# Patient Record
Sex: MALE | Race: WHITE | NOT HISPANIC OR LATINO | URBAN - METROPOLITAN AREA
[De-identification: names, ages, dates, MRNs, and addresses within clinical notes are randomized per-mention and may not be internally consistent; named-entity substitution may affect disease eponyms.]

---

## 2019-07-30 PROBLEM — Z00.00 ENCOUNTER FOR PREVENTIVE HEALTH EXAMINATION: Status: ACTIVE | Noted: 2019-07-30

## 2019-08-22 ENCOUNTER — OUTPATIENT (OUTPATIENT)
Dept: OUTPATIENT SERVICES | Facility: HOSPITAL | Age: 69
LOS: 1 days | End: 2019-08-22
Payer: MEDICARE

## 2019-08-22 ENCOUNTER — APPOINTMENT (OUTPATIENT)
Dept: INTERVENTIONAL RADIOLOGY/VASCULAR | Facility: HOSPITAL | Age: 69
End: 2019-08-22

## 2019-08-22 LAB — GLUCOSE BLDC GLUCOMTR-MCNC: 124 MG/DL — HIGH (ref 70–99)

## 2019-08-22 PROCEDURE — A9552: CPT

## 2019-08-22 PROCEDURE — 78815 PET IMAGE W/CT SKULL-THIGH: CPT

## 2019-08-22 PROCEDURE — 82962 GLUCOSE BLOOD TEST: CPT

## 2019-08-22 PROCEDURE — 78815 PET IMAGE W/CT SKULL-THIGH: CPT | Mod: 26,PS

## 2019-10-02 ENCOUNTER — RESULT REVIEW (OUTPATIENT)
Age: 69
End: 2019-10-02

## 2019-10-02 ENCOUNTER — APPOINTMENT (OUTPATIENT)
Dept: INTERVENTIONAL RADIOLOGY/VASCULAR | Facility: HOSPITAL | Age: 69
End: 2019-10-02
Payer: MEDICARE

## 2019-10-02 ENCOUNTER — OUTPATIENT (OUTPATIENT)
Dept: OUTPATIENT SERVICES | Facility: HOSPITAL | Age: 69
LOS: 1 days | End: 2019-10-02
Payer: MEDICARE

## 2019-10-02 PROCEDURE — 88305 TISSUE EXAM BY PATHOLOGIST: CPT

## 2019-10-02 PROCEDURE — 76942 ECHO GUIDE FOR BIOPSY: CPT | Mod: 26

## 2019-10-02 PROCEDURE — 47000 NEEDLE BIOPSY OF LIVER PERQ: CPT

## 2019-10-02 PROCEDURE — 99152 MOD SED SAME PHYS/QHP 5/>YRS: CPT

## 2019-10-02 PROCEDURE — 88341 IMHCHEM/IMCYTCHM EA ADD ANTB: CPT

## 2019-10-02 PROCEDURE — 88307 TISSUE EXAM BY PATHOLOGIST: CPT

## 2019-10-02 PROCEDURE — 76942 ECHO GUIDE FOR BIOPSY: CPT

## 2019-10-02 PROCEDURE — 88173 CYTOPATH EVAL FNA REPORT: CPT

## 2019-10-04 LAB
NON-GYNECOLOGICAL CYTOLOGY STUDY: SIGNIFICANT CHANGE UP
SURGICAL PATHOLOGY STUDY: SIGNIFICANT CHANGE UP

## 2019-10-15 ENCOUNTER — APPOINTMENT (OUTPATIENT)
Dept: SURGICAL ONCOLOGY | Facility: CLINIC | Age: 69
End: 2019-10-15
Payer: MEDICARE

## 2019-10-15 VITALS
HEIGHT: 69 IN | HEART RATE: 86 BPM | BODY MASS INDEX: 15.85 KG/M2 | SYSTOLIC BLOOD PRESSURE: 172 MMHG | WEIGHT: 107 LBS | TEMPERATURE: 98.3 F | DIASTOLIC BLOOD PRESSURE: 91 MMHG

## 2019-10-15 DIAGNOSIS — Z80.1 FAMILY HISTORY OF MALIGNANT NEOPLASM OF TRACHEA, BRONCHUS AND LUNG: ICD-10-CM

## 2019-10-15 DIAGNOSIS — Z85.118 PERSONAL HISTORY OF OTHER MALIGNANT NEOPLASM OF BRONCHUS AND LUNG: ICD-10-CM

## 2019-10-15 DIAGNOSIS — F17.200 NICOTINE DEPENDENCE, UNSPECIFIED, UNCOMPLICATED: ICD-10-CM

## 2019-10-15 DIAGNOSIS — Z87.39 PERSONAL HISTORY OF OTHER DISEASES OF THE MUSCULOSKELETAL SYSTEM AND CONNECTIVE TISSUE: ICD-10-CM

## 2019-10-15 PROCEDURE — 99204 OFFICE O/P NEW MOD 45 MIN: CPT

## 2019-10-15 RX ORDER — COLCHICINE 0.6 MG/1
0.6 TABLET ORAL
Refills: 0 | Status: ACTIVE | COMMUNITY

## 2019-10-15 NOTE — ASSESSMENT
[FreeTextEntry1] : I) hepatoma\par \par P) Long discussion w patient and wife (who translated) about findings. Has a mass off of the bottom of segment 6. The liver is rounded on imaging consistent with fibrosis. Hepatitis serologies not done. Does have a significant drinking history. Liver function all normal. Best treatment will be surgical. Will be assessed by medicine and pulmonology. If cleared recommend a minimally invasive partial liver resection. If not a surgical candidate then will discuss w Dr Garcia. Will present at tumor board. All questions answered.\par \par Hector Nelson MD\par \par Chief Surgical Oncology\par Multidisciplinary GI cancer program\par [facility] [place]\par [facility]\par \par Professor Surgery\par [employer]\par \par cc: Dr Brink, Dr Garcia, Dr Ball\par \par

## 2019-10-15 NOTE — RESULTS/DATA
[FreeTextEntry1] : Diagnostic Studies\par Date: 10/2/19\par Study: IR Biopsy Right Lobe Liver Mass\par Pathology: Hepatocecllular carcinoma. See advanced result citation for full report\par \par Date: 9/14/19\par Study: MRI Abdomen WWO\par Results: Slightly exophytic mass in segment 6 of the liver measuring 3.4cm which demonstrates arterial hyperenhancement, washout and pseudocapsule. Imaging features are most suggestive of a hepatocellular carcinoma. However, metastatic disease from patient's known lung cancer remains a possibility. No other focal liver lesions. Consider liver biopsy for definitive diagnosis if clinically warranted. \par Stable atrophy of the left hepatic lobe with mild intrahepatic biliary duct dilatation. Stable changes of chronic pancreatitis with dilatation of the main pancreatic duct up to 1.2cm which contains multiple calculi. No focal pancreatic mass lesion. No peripancreatic abnormalities.\par \par Date: 8/22/19\par Study: PET CT\par Results: (1) There are two low density liver masses which are not hypermetabolic. They are therefore not likely to represent metastatic disease. However, they cannot be further characterized. Recommend correlation with prior imaging exams. MRI could be used to further characterize these lesions. (2) Abnormal pancreas, consistent with chronic pancreatitis.  Recommend comparison with prior imaging studies to ensure stability given presence of foci of increased activity in the head. The pancreas could also be further evaluated with MRI. (3) Severe emphysema (4) Severe atherosclerotic disease\par Pancreas - Markedly abnormal pancreas, with multiple calcifications throughout the pancreas, consistent with chronic pancreatitis. Main pancreatic duct dilated to 1.0cm in pancreatic neck. No definitive pancreatic mass is seen, through there are foci of increased activity in prominent pancreatic head, with SUV max of 4.2.\par Liver - There is a 2.2x1.9cm low density lesion in segment four of the liver adjacent to the falciform ligament. This is not hypermetabolic. It may represent focal fatty infiltration. A 3.4x2.9cm low density lesion in the inferior aspect of segment six of the liver is more dense than a simple cyst, measuring 42 Hounsfield units. It is not hypermetabolic.\par \par Labs:\par Date: 10/8/19\par Results: Albumin 4.2, Total Bilirubin 0.3, AFP tumor marker 2.8, CEA 2.8\par \par Date: 9/19/19\par Results: Albumin 4.3, Total Bilirubin 0.4, AST 16, ALT 6, Alk Phos 68, Platelet 305, PT 10.8, INR 1.0\par

## 2019-10-15 NOTE — HISTORY OF PRESENT ILLNESS
[de-identified] : Patient Name: DAGMAR CAIN \par MRN: [id number] \par [facility] MRN: [id number] \par Referring Provider: none \par Oncologist: Dr. Tereso Liang.\par Date: Oct 15, 2019 \par \par Diagnosis: HCC\par \par  69 year M who presents for evaluation of HCC. He has a history of right sided lung cancer in 2006 s/p resection and chemotherapy. Patient reports a 10lb unintentional weight loss over 5-6 months, poor appetite. His primary care doctor, Dr. Brink, started a workup. PET CT did reveal two low density liver masses which are not hypermetabolic. He then went for an MRI abdomen and findings revealed a mass in segment 6 of the liver, likely HCC. IR biopsy of the right lobe liver mass revealed hepatocellular carcinoma.\par He is going to see his oncologist, Dr. Liang next week.\par \par Curently, Mr. CAIN denies abdominal pain and discomfort, denies having decreased appetite, and denies nausea or vomiting. He denies changes in bowel habits and admits to recent unintentional weight loss. He denies fevers, chills, or night sweats.\par \par Functional Status: Mr. CAIN is able to walk 2 flights of stairs without fatigue or dyspnea.\par \par

## 2019-10-15 NOTE — REASON FOR VISIT
[Initial Consultation] : an initial consultation for [Spouse] : spouse [Liver Cancer] : liver cancer

## 2019-10-15 NOTE — PHYSICAL EXAM
[Normal] : supple, no neck mass and thyroid not enlarged [Normal] : oriented to person, place and time, with appropriate affect [de-identified] : no masses [de-identified] : Anicteric [de-identified] : Thin, no acute distress

## 2019-11-20 ENCOUNTER — APPOINTMENT (OUTPATIENT)
Dept: CT IMAGING | Facility: HOSPITAL | Age: 69
End: 2019-11-20

## 2019-11-20 ENCOUNTER — APPOINTMENT (OUTPATIENT)
Dept: CT IMAGING | Facility: HOSPITAL | Age: 69
End: 2019-11-20
Payer: MEDICARE

## 2019-11-22 ENCOUNTER — MOBILE ON CALL (OUTPATIENT)
Age: 69
End: 2019-11-22

## 2019-11-23 ENCOUNTER — APPOINTMENT (OUTPATIENT)
Dept: CT IMAGING | Facility: HOSPITAL | Age: 69
End: 2019-11-23
Payer: MEDICARE

## 2019-11-23 ENCOUNTER — OUTPATIENT (OUTPATIENT)
Dept: OUTPATIENT SERVICES | Facility: HOSPITAL | Age: 69
LOS: 1 days | End: 2019-11-23
Payer: MEDICARE

## 2019-11-23 ENCOUNTER — APPOINTMENT (OUTPATIENT)
Dept: CT IMAGING | Facility: HOSPITAL | Age: 69
End: 2019-11-23

## 2019-11-23 PROCEDURE — 74160 CT ABDOMEN W/CONTRAST: CPT | Mod: 26

## 2019-11-23 PROCEDURE — 71260 CT THORAX DX C+: CPT | Mod: 26

## 2019-11-23 PROCEDURE — 71260 CT THORAX DX C+: CPT

## 2019-11-23 PROCEDURE — 74160 CT ABDOMEN W/CONTRAST: CPT

## 2019-11-26 ENCOUNTER — APPOINTMENT (OUTPATIENT)
Dept: SURGICAL ONCOLOGY | Facility: CLINIC | Age: 69
End: 2019-11-26
Payer: MEDICARE

## 2019-11-26 ENCOUNTER — LABORATORY RESULT (OUTPATIENT)
Age: 69
End: 2019-11-26

## 2019-11-26 VITALS
HEART RATE: 92 BPM | TEMPERATURE: 97.7 F | HEIGHT: 69 IN | WEIGHT: 106 LBS | BODY MASS INDEX: 15.7 KG/M2 | SYSTOLIC BLOOD PRESSURE: 125 MMHG | DIASTOLIC BLOOD PRESSURE: 84 MMHG

## 2019-11-26 DIAGNOSIS — Z01.818 ENCOUNTER FOR OTHER PREPROCEDURAL EXAMINATION: ICD-10-CM

## 2019-11-26 DIAGNOSIS — C22.0 LIVER CELL CARCINOMA: ICD-10-CM

## 2019-11-26 PROCEDURE — 99214 OFFICE O/P EST MOD 30 MIN: CPT

## 2019-11-26 NOTE — HISTORY OF PRESENT ILLNESS
[de-identified] : Patient Name: DAGMAR CAIN \par MRN: [id number] \par [facility] MRN: [id number] \par Referring Provider: none \par Oncologist: Dr. Tereso Liang.\par Date: 11/26/19\par \par Diagnosis: HCC\par \par Mr. CAIN is a 69 year M who presents for follow up of HCC. He was last seen on 10/15/19 for an initial consult and the plan was for patient to be assessed by medicine and pulmonary and return for surgical planning. He saw his medical doctor, Dr. Brink, had an ekg and echocardiogram. He also saw his pulmonologist who did PFTs.\par \par Curently, Mr. CAIN denies abdominal pain and discomfort, denies having decreased appetite, and denies nausea or vomiting. He denies changes in bowel habits and admits to recent unintentional weight loss. He denies fevers, chills, or night sweats.\par \par

## 2019-11-26 NOTE — PHYSICAL EXAM
[Normal] : grossly intact [de-identified] : Thin, in no acute distress [de-identified] : S1,S2, regular rate and rhythm. No murmurs heard. [de-identified] : Anicteric [de-identified] : Clear throughout. No wheezes heard. [de-identified] : warm and dry

## 2019-11-26 NOTE — ASSESSMENT
[FreeTextEntry1] : I) HCC\par \par P) Again discussed clinical findings. Has a 3cm isolated hepatoma at the inferior pole of segment 6. Liver function is preserved although on imaging some findings consistent with liver fibrosis. Pulmonary evaluation (see under forms/clerance) shows good PFTs. Discussed a minimally invasive resection. Risks and benefits discussed, including but not limited to post bleeding, infection,  All questions answered. Arrangements will be made\par \par Hector Nelson MD\par \par Chief Surgical Oncology\par Multidisciplinary GI cancer program\par [facility] [place]\par [facility]\par \par Professor Surgery\par [employer] [facility] [employer] [facility] [employer]\par \par dr Liang, Dr Nunez\par

## 2019-11-26 NOTE — RESULTS/DATA
[FreeTextEntry1] : Diagnostic Studies\par Date: 11/23/19\par Study: CT CAP ([facility])\par Results: Segment 6 hepatoma, stable from 8/19. No additional hepatic lesions. No metastatic disease chest and abdomen.\par \par \par Labs:\par Date: 11/9/19\par Results: TBili 0.2, Alk Phos 82, AST 12, ALT 8, H/H 10.3/31.2, Plt 511, PT/INR 10.5/1.0\par \par Pathology: No new results to review\par \par \par

## 2019-12-09 LAB
ABO + RH PNL BLD: NORMAL
ABO + RH PNL BLD: NORMAL
ALBUMIN SERPL ELPH-MCNC: 4.2 G/DL
ALP BLD-CCNC: 85 U/L
ALT SERPL-CCNC: 5 U/L
ANION GAP SERPL CALC-SCNC: 16 MMOL/L
APTT BLD: 31.2 SEC
AST SERPL-CCNC: 9 U/L
BASOPHILS # BLD AUTO: 0.05 K/UL
BASOPHILS NFR BLD AUTO: 0.7 %
BILIRUB DIRECT SERPL-MCNC: 0 MG/DL
BILIRUB INDIRECT SERPL-MCNC: 0.1 MG/DL
BILIRUB SERPL-MCNC: 0.2 MG/DL
BUN SERPL-MCNC: 20 MG/DL
CALCIUM SERPL-MCNC: 10.3 MG/DL
CHLORIDE SERPL-SCNC: 106 MMOL/L
CO2 SERPL-SCNC: 21 MMOL/L
CREAT SERPL-MCNC: 1.4 MG/DL
EOSINOPHIL # BLD AUTO: 0.34 K/UL
EOSINOPHIL NFR BLD AUTO: 4.6 %
GLUCOSE SERPL-MCNC: 96 MG/DL
HBV CORE IGM SER QL: NONREACTIVE
HBV SURFACE AB SER QL: NONREACTIVE
HBV SURFACE AG SER QL: NONREACTIVE
HCT VFR BLD CALC: 35 %
HCV AB SER QL: NONREACTIVE
HCV S/CO RATIO: 0.19 S/CO
HEPATITIS A IGG ANTIBODY: REACTIVE
HGB BLD-MCNC: 10.6 G/DL
IMM GRANULOCYTES NFR BLD AUTO: 0.5 %
INR PPP: 1.02 RATIO
LYMPHOCYTES # BLD AUTO: 1.9 K/UL
LYMPHOCYTES NFR BLD AUTO: 25.7 %
MAN DIFF?: NORMAL
MCHC RBC-ENTMCNC: 29.6 PG
MCHC RBC-ENTMCNC: 30.3 GM/DL
MCV RBC AUTO: 97.8 FL
MONOCYTES # BLD AUTO: 0.61 K/UL
MONOCYTES NFR BLD AUTO: 8.3 %
NEUTROPHILS # BLD AUTO: 4.45 K/UL
NEUTROPHILS NFR BLD AUTO: 60.2 %
PLATELET # BLD AUTO: 394 K/UL
POTASSIUM SERPL-SCNC: 4.7 MMOL/L
POTASSIUM SERPL-SCNC: 5.4 MMOL/L
PROT SERPL-MCNC: 8 G/DL
PT BLD: 11.7 SEC
RBC # BLD: 3.58 M/UL
RBC # FLD: 14.9 %
SODIUM SERPL-SCNC: 143 MMOL/L
WBC # FLD AUTO: 7.39 K/UL

## 2019-12-10 VITALS
TEMPERATURE: 98 F | HEIGHT: 69 IN | OXYGEN SATURATION: 100 % | SYSTOLIC BLOOD PRESSURE: 168 MMHG | RESPIRATION RATE: 18 BRPM | WEIGHT: 105.38 LBS | HEART RATE: 95 BPM | DIASTOLIC BLOOD PRESSURE: 80 MMHG

## 2019-12-11 ENCOUNTER — APPOINTMENT (OUTPATIENT)
Dept: SURGICAL ONCOLOGY | Facility: HOSPITAL | Age: 69
End: 2019-12-11

## 2019-12-11 ENCOUNTER — RESULT REVIEW (OUTPATIENT)
Age: 69
End: 2019-12-11

## 2019-12-11 ENCOUNTER — INPATIENT (INPATIENT)
Facility: HOSPITAL | Age: 69
LOS: 1 days | Discharge: HOME | DRG: 405 | End: 2019-12-13
Attending: SURGERY | Admitting: SURGERY
Payer: MEDICARE

## 2019-12-11 DIAGNOSIS — Z90.2 ACQUIRED ABSENCE OF LUNG [PART OF]: Chronic | ICD-10-CM

## 2019-12-11 DIAGNOSIS — S86.009A UNSPECIFIED INJURY OF UNSPECIFIED ACHILLES TENDON, INITIAL ENCOUNTER: Chronic | ICD-10-CM

## 2019-12-11 LAB
BASE EXCESS BLDA CALC-SCNC: -2.8 MMOL/L — LOW (ref -2–3)
BASOPHILS # BLD AUTO: 0.05 K/UL — SIGNIFICANT CHANGE UP (ref 0–0.2)
BASOPHILS NFR BLD AUTO: 0.5 % — SIGNIFICANT CHANGE UP (ref 0–2)
EOSINOPHIL # BLD AUTO: 0.15 K/UL — SIGNIFICANT CHANGE UP (ref 0–0.5)
EOSINOPHIL NFR BLD AUTO: 1.5 % — SIGNIFICANT CHANGE UP (ref 0–6)
GLUCOSE BLDC GLUCOMTR-MCNC: 172 MG/DL — HIGH (ref 70–99)
HCO3 BLDA-SCNC: 22 MMOL/L — SIGNIFICANT CHANGE UP (ref 21–28)
HCT VFR BLD CALC: 28.1 % — LOW (ref 39–50)
HGB BLD-MCNC: 8.9 G/DL — LOW (ref 13–17)
IMM GRANULOCYTES NFR BLD AUTO: 0.7 % — SIGNIFICANT CHANGE UP (ref 0–1.5)
LYMPHOCYTES # BLD AUTO: 1.34 K/UL — SIGNIFICANT CHANGE UP (ref 1–3.3)
LYMPHOCYTES # BLD AUTO: 13.2 % — SIGNIFICANT CHANGE UP (ref 13–44)
MCHC RBC-ENTMCNC: 29.7 PG — SIGNIFICANT CHANGE UP (ref 27–34)
MCHC RBC-ENTMCNC: 31.7 GM/DL — LOW (ref 32–36)
MCV RBC AUTO: 93.7 FL — SIGNIFICANT CHANGE UP (ref 80–100)
MONOCYTES # BLD AUTO: 0.45 K/UL — SIGNIFICANT CHANGE UP (ref 0–0.9)
MONOCYTES NFR BLD AUTO: 4.4 % — SIGNIFICANT CHANGE UP (ref 2–14)
NEUTROPHILS # BLD AUTO: 8.09 K/UL — HIGH (ref 1.8–7.4)
NEUTROPHILS NFR BLD AUTO: 79.7 % — HIGH (ref 43–77)
NRBC # BLD: 0 /100 WBCS — SIGNIFICANT CHANGE UP (ref 0–0)
PCO2 BLDA: 40 MMHG — SIGNIFICANT CHANGE UP (ref 35–48)
PH BLDA: 7.37 — SIGNIFICANT CHANGE UP (ref 7.35–7.45)
PLATELET # BLD AUTO: 283 K/UL — SIGNIFICANT CHANGE UP (ref 150–400)
PO2 BLDA: 148 MMHG — HIGH (ref 83–108)
RBC # BLD: 3 M/UL — LOW (ref 4.2–5.8)
RBC # FLD: 15.6 % — HIGH (ref 10.3–14.5)
SAO2 % BLDA: 99 % — SIGNIFICANT CHANGE UP (ref 95–100)
WBC # BLD: 10.15 K/UL — SIGNIFICANT CHANGE UP (ref 3.8–10.5)
WBC # FLD AUTO: 10.15 K/UL — SIGNIFICANT CHANGE UP (ref 3.8–10.5)

## 2019-12-11 PROCEDURE — 47379 UNLISTED LAPS PX LIVER: CPT

## 2019-12-11 PROCEDURE — 88309 TISSUE EXAM BY PATHOLOGIST: CPT | Mod: 26

## 2019-12-11 RX ORDER — SODIUM CHLORIDE 9 MG/ML
1000 INJECTION, SOLUTION INTRAVENOUS
Refills: 0 | Status: DISCONTINUED | OUTPATIENT
Start: 2019-12-11 | End: 2019-12-11

## 2019-12-11 RX ORDER — HYDROMORPHONE HYDROCHLORIDE 2 MG/ML
0.5 INJECTION INTRAMUSCULAR; INTRAVENOUS; SUBCUTANEOUS EVERY 4 HOURS
Refills: 0 | Status: DISCONTINUED | OUTPATIENT
Start: 2019-12-11 | End: 2019-12-13

## 2019-12-11 RX ORDER — HYDRALAZINE HCL 50 MG
5 TABLET ORAL ONCE
Refills: 0 | Status: COMPLETED | OUTPATIENT
Start: 2019-12-11 | End: 2019-12-11

## 2019-12-11 RX ORDER — HYDROMORPHONE HYDROCHLORIDE 2 MG/ML
1 INJECTION INTRAMUSCULAR; INTRAVENOUS; SUBCUTANEOUS EVERY 4 HOURS
Refills: 0 | Status: DISCONTINUED | OUTPATIENT
Start: 2019-12-11 | End: 2019-12-11

## 2019-12-11 RX ORDER — LABETALOL HCL 100 MG
10 TABLET ORAL ONCE
Refills: 0 | Status: COMPLETED | OUTPATIENT
Start: 2019-12-11 | End: 2019-12-11

## 2019-12-11 RX ORDER — COLCHICINE 0.6 MG
0.6 TABLET ORAL DAILY
Refills: 0 | Status: DISCONTINUED | OUTPATIENT
Start: 2019-12-11 | End: 2019-12-13

## 2019-12-11 RX ORDER — HYDROMORPHONE HYDROCHLORIDE 2 MG/ML
0.25 INJECTION INTRAMUSCULAR; INTRAVENOUS; SUBCUTANEOUS EVERY 4 HOURS
Refills: 0 | Status: DISCONTINUED | OUTPATIENT
Start: 2019-12-11 | End: 2019-12-13

## 2019-12-11 RX ORDER — ONDANSETRON 8 MG/1
4 TABLET, FILM COATED ORAL EVERY 6 HOURS
Refills: 0 | Status: DISCONTINUED | OUTPATIENT
Start: 2019-12-11 | End: 2019-12-13

## 2019-12-11 RX ORDER — HYDROMORPHONE HYDROCHLORIDE 2 MG/ML
0.5 INJECTION INTRAMUSCULAR; INTRAVENOUS; SUBCUTANEOUS EVERY 4 HOURS
Refills: 0 | Status: DISCONTINUED | OUTPATIENT
Start: 2019-12-11 | End: 2019-12-11

## 2019-12-11 RX ADMIN — Medication 10 MILLIGRAM(S): at 16:16

## 2019-12-11 RX ADMIN — Medication 5 MILLIGRAM(S): at 13:43

## 2019-12-11 NOTE — H&P ADULT - NSICDXPASTSURGICALHX_GEN_ALL_CORE_FT
PAST SURGICAL HISTORY:  Achilles tendon injury right, s/p surgery    History of lobectomy of lung right

## 2019-12-11 NOTE — H&P ADULT - ASSESSMENT
Pt is a 68 y/o M with PMH of Gout, lung cancer s/p Right lobectomy and chemo in 2016, COPD, chronic pancreatitis, emphysema, CAD that presents today for laparoscopic liver resection for biopsy proven hepatocellular carcinoma  - to OR   - admit to team 1

## 2019-12-11 NOTE — PROGRESS NOTE ADULT - ASSESSMENT
Pt is a 70 y/o M with PMH of Gout, lung cancer s/p Right lobectomy and chemo in 2016, COPD, chronic pancreatitis, emphysema, CAD that presents today for laparoscopic liver resection for biopsy proven hepatocellular carcinoma. now s/p laparoscopic right lobe liver resection    - regional  - regular diet, IVF   - espinoza removed at end of case, due to void   - labs in PACU, AM labs   - SCDs, holding SQH until CBC comes back

## 2019-12-11 NOTE — BRIEF OPERATIVE NOTE - OPERATION/FINDINGS
cut down approach. 12 mm trochar inserted. all other trochars inserted under direct visualization. thunderbeat and hook cautery used to dissect wedge of right lobe of liver. hemostasis achieved. endocatch used to remove partial liver resection. fascia closed. skin closed primarily.

## 2019-12-11 NOTE — H&P ADULT - HISTORY OF PRESENT ILLNESS
Pt is a 70 y/o M with PMH of Gout, lung cancer s/p Right lobectomy and chemo in 2016, COPD, chronic pancreatitis, emphysema, CAD that presents today for laparoscopic liver resection for biopsy proven hepatocellular carcinoma. patient noticed significant weight loss as well as multiple episodes of vomiting which lead him to a workup that discovered HCC. patient without complaints today.

## 2019-12-11 NOTE — PROGRESS NOTE ADULT - SUBJECTIVE AND OBJECTIVE BOX
POST-OPERATIVE NOTE    Procedure: laparoscopic right lobe liver resection    Diagnosis/Indication:    Surgeon: Dr. Nelson     S: Pt has no complaints. Denies CP, SOB, LIU, calf tenderness. Pain controlled with medication.    O:  T(C): 36.1 (12-11-19 @ 11:45), Max: 36.1 (12-11-19 @ 11:45)  T(F): 96.9 (12-11-19 @ 11:45), Max: 96.9 (12-11-19 @ 11:45)  HR: 74 (12-11-19 @ 14:24) (61 - 74)  BP: 139/67 (12-11-19 @ 14:24) (129/71 - 166/80)  RR: 14 (12-11-19 @ 14:24) (14 - 30)  SpO2: 100% (12-11-19 @ 14:24) (100% - 100%)  Wt(kg): --                        8.9    10.15 )-----------( 283      ( 11 Dec 2019 12:09 )             28.1             Gen: NAD, resting comfortably in bed  C/V: NSR  Pulm: Nonlabored breathing, no respiratory distress  Abd: Soft, non-distended, TTP around incision site, incision clean dry and intact  Extrem: WWP, no calf edema, SCDs in place

## 2019-12-11 NOTE — H&P ADULT - NSHPPHYSICALEXAM_GEN_ALL_CORE
Neuro: Alert and Oriented X 4  Respiratory: CTA b/l. no respiratory distress  Cardiovascular: NSR, RRR  Gastrointestinal: soft, NT/ ND   Extremities: (-) edema b/l

## 2019-12-12 LAB
ALBUMIN SERPL ELPH-MCNC: 3.9 G/DL — SIGNIFICANT CHANGE UP (ref 3.3–5)
ALP SERPL-CCNC: 82 U/L — SIGNIFICANT CHANGE UP (ref 40–120)
ALT FLD-CCNC: 73 U/L — HIGH (ref 10–45)
ANION GAP SERPL CALC-SCNC: 14 MMOL/L — SIGNIFICANT CHANGE UP (ref 5–17)
AST SERPL-CCNC: 83 U/L — HIGH (ref 10–40)
BILIRUB SERPL-MCNC: 0.4 MG/DL — SIGNIFICANT CHANGE UP (ref 0.2–1.2)
BUN SERPL-MCNC: 23 MG/DL — SIGNIFICANT CHANGE UP (ref 7–23)
CALCIUM SERPL-MCNC: 9.7 MG/DL — SIGNIFICANT CHANGE UP (ref 8.4–10.5)
CHLORIDE SERPL-SCNC: 98 MMOL/L — SIGNIFICANT CHANGE UP (ref 96–108)
CO2 SERPL-SCNC: 24 MMOL/L — SIGNIFICANT CHANGE UP (ref 22–31)
CREAT SERPL-MCNC: 1.24 MG/DL — SIGNIFICANT CHANGE UP (ref 0.5–1.3)
GLUCOSE SERPL-MCNC: 129 MG/DL — HIGH (ref 70–99)
HCT VFR BLD CALC: 32.7 % — LOW (ref 39–50)
HCV AB S/CO SERPL IA: 0.17 S/CO — SIGNIFICANT CHANGE UP
HCV AB SERPL-IMP: SIGNIFICANT CHANGE UP
HGB BLD-MCNC: 10.1 G/DL — LOW (ref 13–17)
MAGNESIUM SERPL-MCNC: 1.5 MG/DL — LOW (ref 1.6–2.6)
MCHC RBC-ENTMCNC: 28.9 PG — SIGNIFICANT CHANGE UP (ref 27–34)
MCHC RBC-ENTMCNC: 30.9 GM/DL — LOW (ref 32–36)
MCV RBC AUTO: 93.4 FL — SIGNIFICANT CHANGE UP (ref 80–100)
NRBC # BLD: 0 /100 WBCS — SIGNIFICANT CHANGE UP (ref 0–0)
PHOSPHATE SERPL-MCNC: 4 MG/DL — SIGNIFICANT CHANGE UP (ref 2.5–4.5)
PLATELET # BLD AUTO: 315 K/UL — SIGNIFICANT CHANGE UP (ref 150–400)
POTASSIUM SERPL-MCNC: 3.9 MMOL/L — SIGNIFICANT CHANGE UP (ref 3.5–5.3)
POTASSIUM SERPL-SCNC: 3.9 MMOL/L — SIGNIFICANT CHANGE UP (ref 3.5–5.3)
PROT SERPL-MCNC: 8 G/DL — SIGNIFICANT CHANGE UP (ref 6–8.3)
RBC # BLD: 3.5 M/UL — LOW (ref 4.2–5.8)
RBC # FLD: 15.4 % — HIGH (ref 10.3–14.5)
SODIUM SERPL-SCNC: 136 MMOL/L — SIGNIFICANT CHANGE UP (ref 135–145)
WBC # BLD: 13.29 K/UL — HIGH (ref 3.8–10.5)
WBC # FLD AUTO: 13.29 K/UL — HIGH (ref 3.8–10.5)

## 2019-12-12 RX ORDER — FOLIC ACID 0.8 MG
1 TABLET ORAL
Qty: 0 | Refills: 0 | DISCHARGE

## 2019-12-12 RX ORDER — COLCHICINE 0.6 MG
1 TABLET ORAL
Qty: 0 | Refills: 0 | DISCHARGE

## 2019-12-12 RX ORDER — LABETALOL HCL 100 MG
5 TABLET ORAL ONCE
Refills: 0 | Status: COMPLETED | OUTPATIENT
Start: 2019-12-12 | End: 2019-12-12

## 2019-12-12 RX ORDER — METOCLOPRAMIDE HCL 10 MG
1 TABLET ORAL
Qty: 0 | Refills: 0 | DISCHARGE

## 2019-12-12 RX ORDER — TAMSULOSIN HYDROCHLORIDE 0.4 MG/1
0.4 CAPSULE ORAL DAILY
Refills: 0 | Status: DISCONTINUED | OUTPATIENT
Start: 2019-12-12 | End: 2019-12-12

## 2019-12-12 RX ORDER — CHOLECALCIFEROL (VITAMIN D3) 125 MCG
1 CAPSULE ORAL
Qty: 0 | Refills: 0 | DISCHARGE

## 2019-12-12 RX ORDER — ONDANSETRON 8 MG/1
4 TABLET, FILM COATED ORAL ONCE
Refills: 0 | Status: COMPLETED | OUTPATIENT
Start: 2019-12-12 | End: 2019-12-12

## 2019-12-12 RX ORDER — MAGNESIUM SULFATE 500 MG/ML
2 VIAL (ML) INJECTION
Refills: 0 | Status: COMPLETED | OUTPATIENT
Start: 2019-12-12 | End: 2019-12-12

## 2019-12-12 RX ORDER — IRON POLYSACCHARIDE COMPLEX 150 MG
1 CAPSULE ORAL
Qty: 0 | Refills: 0 | DISCHARGE

## 2019-12-12 RX ADMIN — Medication 5 MILLIGRAM(S): at 19:28

## 2019-12-12 RX ADMIN — HYDROMORPHONE HYDROCHLORIDE 0.5 MILLIGRAM(S): 2 INJECTION INTRAMUSCULAR; INTRAVENOUS; SUBCUTANEOUS at 14:23

## 2019-12-12 RX ADMIN — ONDANSETRON 4 MILLIGRAM(S): 8 TABLET, FILM COATED ORAL at 18:28

## 2019-12-12 RX ADMIN — HYDROMORPHONE HYDROCHLORIDE 0.5 MILLIGRAM(S): 2 INJECTION INTRAMUSCULAR; INTRAVENOUS; SUBCUTANEOUS at 10:10

## 2019-12-12 RX ADMIN — HYDROMORPHONE HYDROCHLORIDE 0.5 MILLIGRAM(S): 2 INJECTION INTRAMUSCULAR; INTRAVENOUS; SUBCUTANEOUS at 14:35

## 2019-12-12 RX ADMIN — Medication 50 GRAM(S): at 11:12

## 2019-12-12 RX ADMIN — HYDROMORPHONE HYDROCHLORIDE 0.5 MILLIGRAM(S): 2 INJECTION INTRAMUSCULAR; INTRAVENOUS; SUBCUTANEOUS at 09:51

## 2019-12-12 RX ADMIN — ONDANSETRON 4 MILLIGRAM(S): 8 TABLET, FILM COATED ORAL at 11:56

## 2019-12-12 RX ADMIN — HYDROMORPHONE HYDROCHLORIDE 0.5 MILLIGRAM(S): 2 INJECTION INTRAMUSCULAR; INTRAVENOUS; SUBCUTANEOUS at 18:25

## 2019-12-12 RX ADMIN — ONDANSETRON 4 MILLIGRAM(S): 8 TABLET, FILM COATED ORAL at 08:15

## 2019-12-12 RX ADMIN — Medication 50 GRAM(S): at 09:51

## 2019-12-12 NOTE — PROGRESS NOTE ADULT - ASSESSMENT
68 y/o M with PMH of Gout, lung cancer s/p Right lobectomy and chemo in 2016, COPD, chronic pancreatitis, emphysema, CAD that presents today for laparoscopic liver resection for biopsy proven hepatocellular carcinoma. now s/p laparoscopic right lobe liver resection 12/11    - regular diet  - pain/nausea control   - SCDs, holding SQH   - Passed TOV this am  - Dc today if stable

## 2019-12-12 NOTE — DIETITIAN INITIAL EVALUATION ADULT. - MALNUTRITION
Per physical assessment: Muscle Wasting- Temporal [ X-SEVERE  ]  Clavicle/Pectoral [ X-SEVERE  ]  Shoulder/Deltoid [   ]  Scapula [   ]  Interosseous [ X-SEVERE   ]  Quadriceps [   ]  Gastrocnemius [   ]; Fat Wasting- Orbital [ X-SEVERE   ]  Buccal [ X-SEVERE   ]  Triceps [   ]  Rib [   ]--> Suspect [PCM] 2/2 to physical assessment; please see malnutrition chart note. Suspect severe malnutrition

## 2019-12-12 NOTE — DIETITIAN INITIAL EVALUATION ADULT. - OTHER INFO
69M with hx of  Gout, lung cancer s/p Right lobectomy and chemo in 2016, COPD, chronic pancreatitis, emphysema, CAD, recent bx +heptocellular carcinoma now presenting for laparoscopic liver resection (12/11), now POD #1. On assessment, pt resting in bed with no currently complaints. Pt is very cachetic appearing and very weak. Currently, pt is on regular diet, recommend continue liberalized diet to encourage PO intake. Pt currently tolerating <25% meals with emesis s/p PO intake noted today. 69M with hx of  Gout, lung cancer s/p Right lobectomy and chemo in 2016, COPD, chronic pancreatitis, emphysema, CAD, recent bx +heptocellular carcinoma now presenting for laparoscopic liver resection (12/11), now POD #1. On assessment, pt resting in bed with no currently complaints. Per EMR, pt with c/o significant weight loss and anorexia PTA- no specific amount was noted. Currently, pt is on regular diet, recommend continue liberalized diet to encourage PO intake. Pt currently tolerating <25% meals with emesis s/p PO intake noted today. On assessment, pt appeared very cachetic and very weak. Currently in a deep sleep unable to wake. Hx was limited to team and EMR. NFPE completed was +severe muscle wasting of temporal, Clavicle/ Pectoral, and Interosseous regions and severe subcutaneous fat wasting of orbital and buccal regions. Unable to assess specific wt loss and PO intake at this time- RD to follow up to further bolster suspect malnutrition dx. Per ASPEN guidelines, pt meets criteria for severe malnutrition- disc with team. DAVID. GI: WDL per flowsheet, +emesis s/p PO intake today. Skin: surgical incisions, osvaldo score 20. Pain: none noted at this time. Education deferred at this time. Please see below for nutrition recommendations. Rec aggressive Lyte replacement for suspect refeeding syndrome 2/2 suspected severely malnourished state.  RD to follow up per protocol.

## 2019-12-12 NOTE — CHART NOTE - NSCHARTNOTEFT_GEN_A_CORE
Upon Nutritional Assessment by the Registered Dietitian your patient was determined to meet criteria / has evidence of the following diagnosis/diagnoses:          [ ]  Mild Protein Calorie Malnutrition        [ ]  Moderate Protein Calorie Malnutrition        [ x ] Severe Protein Calorie Malnutrition        [ ] Unspecified Protein Calorie Malnutrition        [ x  ] Underweight / BMI <19        [ ] Morbid Obesity / BMI > 40      Findings as based on:  •  Comprehensive nutrition assessment and consultation  •  Calorie counts (nutrient intake analysis)  •  Food acceptance and intake status from observations by staff  •  Follow up  •  Patient education  •  Intervention secondary to interdisciplinary rounds  •   concerns    Per physical assessment: Muscle Wasting- Temporal [ X-SEVERE  ]  Clavicle/Pectoral [ X-SEVERE  ]  Shoulder/Deltoid [   ]  Scapula [   ]  Interosseous [ X-SEVERE   ]  Quadriceps [   ]  Gastrocnemius [   ]; Fat Wasting- Orbital [ X-SEVERE   ]  Buccal [ X-SEVERE   ]  Triceps [   ]  Rib [   ]--> Suspect [PCM] 2/2 to physical assessment; please see malnutrition chart note.    Treatment:    The following diet has been recommended:  1. Recommend continue with regular diet to encourage PO intake   2. Recommend add Ensure Enlive BID (700 kcal, 40g protein, 360 mL free H2O)   3. Recommend MVI   4. Recommend aggressive Lyte repletion to prevent refeeding syndrome    PROVIDER Section:     By signing this assessment you are acknowledging and agree with the diagnosis/diagnoses assigned by the Registered Dietitian    Comments:

## 2019-12-12 NOTE — DIETITIAN INITIAL EVALUATION ADULT. - ADD RECOMMEND
1. Recommend continue with regular diet to encourage PO intake 2. Recommend add Ensure Enlive BID (700 kcal, 40g protein, 360 mL free H2O) 3. Recommend MVI 4. Recommend aggressive Lyte repletion to prevent refeeding syndrome

## 2019-12-12 NOTE — PROGRESS NOTE ADULT - SUBJECTIVE AND OBJECTIVE BOX
INTERVAL HPI/OVERNIGHT EVENTS: Failed tov, new tov 8am 12/12. AFVSS.    STATUS POST:  laparoscopic right lobe liver resection 12/11    POST OPERATIVE DAY #: 1    SUBJECTIVE: pt seen and examined at bedside this am by surgery team. Reports nausea/no vomiting. Denies f/n/v/cp/sob.    MEDICATIONS  (STANDING):  colchicine 0.6 milliGRAM(s) Oral daily  labetalol Injectable 5 milliGRAM(s) IV Push once    MEDICATIONS  (PRN):  HYDROmorphone  Injectable 0.5 milliGRAM(s) IV Push every 4 hours PRN Severe Pain (7 - 10)  HYDROmorphone  Injectable 0.25 milliGRAM(s) IV Push every 4 hours PRN Moderate Pain (4 - 6)  ondansetron Injectable 4 milliGRAM(s) IV Push every 6 hours PRN Nausea      Vital Signs Last 24 Hrs  T(C): 36.4 (12 Dec 2019 16:34), Max: 36.7 (11 Dec 2019 20:44)  T(F): 97.6 (12 Dec 2019 16:34), Max: 98.1 (12 Dec 2019 05:29)  HR: 95 (12 Dec 2019 16:34) (67 - 97)  BP: 168/90 (12 Dec 2019 17:45) (142/71 - 181/88)  BP(mean): --  RR: 17 (12 Dec 2019 16:34) (16 - 20)  SpO2: 96% (12 Dec 2019 16:34) (95% - 100%)    PHYSICAL EXAM:    Constitutional: A&Ox3, NAD    Respiratory: non labored breathing, no respiratory distress    Cardiovascular: NSR, RRR    Gastrointestinal: Soft, non-distended, TTP around incision site, incision clean dry and intact    Extremities: wwp, no calf tenderness or edema. SCDs in place       I&O's Detail    11 Dec 2019 07:01  -  12 Dec 2019 07:00  --------------------------------------------------------  IN:    lactated ringers.: 550 mL  Total IN: 550 mL    OUT:    Ureteral Catheter: 425 mL    Voided: 625 mL  Total OUT: 1050 mL    Total NET: -500 mL      12 Dec 2019 07:01  -  12 Dec 2019 19:24  --------------------------------------------------------  IN:    IV PiggyBack: 100 mL  Total IN: 100 mL    OUT:    Voided: 450 mL  Total OUT: 450 mL    Total NET: -350 mL          LABS:                        10.1   13.29 )-----------( 315      ( 12 Dec 2019 07:09 )             32.7     12-12    136  |  98  |  23  ----------------------------<  129<H>  3.9   |  24  |  1.24    Ca    9.7      12 Dec 2019 07:09  Phos  4.0     12-12  Mg     1.5     12-12    TPro  8.0  /  Alb  3.9  /  TBili  0.4  /  DBili  x   /  AST  83<H>  /  ALT  73<H>  /  AlkPhos  82  12-12          RADIOLOGY & ADDITIONAL STUDIES:

## 2019-12-13 ENCOUNTER — TRANSCRIPTION ENCOUNTER (OUTPATIENT)
Age: 69
End: 2019-12-13

## 2019-12-13 VITALS
HEART RATE: 89 BPM | OXYGEN SATURATION: 95 % | TEMPERATURE: 98 F | RESPIRATION RATE: 17 BRPM | DIASTOLIC BLOOD PRESSURE: 86 MMHG | SYSTOLIC BLOOD PRESSURE: 163 MMHG

## 2019-12-13 PROBLEM — C22.0 LIVER CELL CARCINOMA: Chronic | Status: ACTIVE | Noted: 2019-12-10

## 2019-12-13 PROBLEM — C34.90 MALIGNANT NEOPLASM OF UNSPECIFIED PART OF UNSPECIFIED BRONCHUS OR LUNG: Chronic | Status: ACTIVE | Noted: 2019-12-10

## 2019-12-13 LAB
ALBUMIN SERPL ELPH-MCNC: 4 G/DL — SIGNIFICANT CHANGE UP (ref 3.3–5)
ALP SERPL-CCNC: 86 U/L — SIGNIFICANT CHANGE UP (ref 40–120)
ALT FLD-CCNC: 79 U/L — HIGH (ref 10–45)
ANION GAP SERPL CALC-SCNC: 11 MMOL/L — SIGNIFICANT CHANGE UP (ref 5–17)
AST SERPL-CCNC: 62 U/L — HIGH (ref 10–40)
BILIRUB SERPL-MCNC: 0.5 MG/DL — SIGNIFICANT CHANGE UP (ref 0.2–1.2)
BUN SERPL-MCNC: 29 MG/DL — HIGH (ref 7–23)
CALCIUM SERPL-MCNC: 9.6 MG/DL — SIGNIFICANT CHANGE UP (ref 8.4–10.5)
CHLORIDE SERPL-SCNC: 100 MMOL/L — SIGNIFICANT CHANGE UP (ref 96–108)
CO2 SERPL-SCNC: 26 MMOL/L — SIGNIFICANT CHANGE UP (ref 22–31)
CREAT SERPL-MCNC: 1.28 MG/DL — SIGNIFICANT CHANGE UP (ref 0.5–1.3)
GLUCOSE SERPL-MCNC: 135 MG/DL — HIGH (ref 70–99)
HCT VFR BLD CALC: 35.3 % — LOW (ref 39–50)
HGB BLD-MCNC: 10.9 G/DL — LOW (ref 13–17)
MAGNESIUM SERPL-MCNC: 2.2 MG/DL — SIGNIFICANT CHANGE UP (ref 1.6–2.6)
MCHC RBC-ENTMCNC: 29.1 PG — SIGNIFICANT CHANGE UP (ref 27–34)
MCHC RBC-ENTMCNC: 30.9 GM/DL — LOW (ref 32–36)
MCV RBC AUTO: 94.1 FL — SIGNIFICANT CHANGE UP (ref 80–100)
NRBC # BLD: 0 /100 WBCS — SIGNIFICANT CHANGE UP (ref 0–0)
PHOSPHATE SERPL-MCNC: 3.6 MG/DL — SIGNIFICANT CHANGE UP (ref 2.5–4.5)
PLATELET # BLD AUTO: 329 K/UL — SIGNIFICANT CHANGE UP (ref 150–400)
POTASSIUM SERPL-MCNC: 4.5 MMOL/L — SIGNIFICANT CHANGE UP (ref 3.5–5.3)
POTASSIUM SERPL-SCNC: 4.5 MMOL/L — SIGNIFICANT CHANGE UP (ref 3.5–5.3)
PROT SERPL-MCNC: 8.1 G/DL — SIGNIFICANT CHANGE UP (ref 6–8.3)
RBC # BLD: 3.75 M/UL — LOW (ref 4.2–5.8)
RBC # FLD: 15.7 % — HIGH (ref 10.3–14.5)
SODIUM SERPL-SCNC: 137 MMOL/L — SIGNIFICANT CHANGE UP (ref 135–145)
WBC # BLD: 15.06 K/UL — HIGH (ref 3.8–10.5)
WBC # FLD AUTO: 15.06 K/UL — HIGH (ref 3.8–10.5)

## 2019-12-13 RX ORDER — LABETALOL HCL 100 MG
10 TABLET ORAL ONCE
Refills: 0 | Status: COMPLETED | OUTPATIENT
Start: 2019-12-13 | End: 2019-12-13

## 2019-12-13 RX ORDER — LABETALOL HCL 100 MG
5 TABLET ORAL ONCE
Refills: 0 | Status: COMPLETED | OUTPATIENT
Start: 2019-12-13 | End: 2019-12-13

## 2019-12-13 RX ORDER — DOCUSATE SODIUM 100 MG
1 CAPSULE ORAL
Qty: 8 | Refills: 0
Start: 2019-12-13 | End: 2019-12-16

## 2019-12-13 RX ADMIN — Medication 5 MILLIGRAM(S): at 01:15

## 2019-12-13 RX ADMIN — Medication 10 MILLIGRAM(S): at 05:27

## 2019-12-13 NOTE — DISCHARGE NOTE NURSING/CASE MANAGEMENT/SOCIAL WORK - NSDCPEEMAIL_GEN_ALL_CORE
[facility] for Tobacco Control email [email or web address]

## 2019-12-13 NOTE — DISCHARGE NOTE NURSING/CASE MANAGEMENT/SOCIAL WORK - NSDCPEWEB_GEN_ALL_CORE
[facility] for Tobacco Control website --- [email or web address] website --- [email or web address]

## 2019-12-13 NOTE — DISCHARGE NOTE PROVIDER - NSDCMRMEDTOKEN_GEN_ALL_CORE_FT
Colace 100 mg oral capsule: 1 cap(s) orally 2 times a day, As Needed -for constipation   colchicine 0.6 mg oral tablet: 1 tab(s) orally once a day  folic acid 0.4 mg oral tablet: 1 tab(s) orally once a day  Iferex 150 oral capsule: 1 cap(s) orally once a day  metoclopramide 5 mg oral tablet: 1 tab(s) orally every 8 hours, As Needed  Percocet 5/325 oral tablet: 1 tab(s) orally every 6 hours, As Needed -for severe pain MDD:4   Vitamin D3 50,000 intl units oral capsule: 1 tab(s) orally once a week

## 2019-12-13 NOTE — DISCHARGE NOTE PROVIDER - NSDCACTIVITY_GEN_ALL_CORE
Walking - Indoors allowed/Return to Work/School allowed/No heavy lifting/straining/Walking - Outdoors allowed

## 2019-12-13 NOTE — DISCHARGE NOTE PROVIDER - NSDCHOSPICE_GEN_A_CORE
Physical Exam  Mallampati: II  TM Distance: >3 FB  Neck ROM: Full  cardiovascular exam normal  pulmonary exam normal  abdominal exam normal  dental exam normal      Anesthesia Plan  ASA Status: 1  Anesthesia Type: General  Induction: Intravenous  Preferred Airway Type: ETT  Reviewed: Lab Results, Nursing Notes, Pre-Induction Reassessment, NPO Status, Medications, Past Med History, Problem List, Allergies, DNR Status and Patient Summary  The proposed anesthetic plan, including its risks and benefits, have been discussed with the Patient - along with the risks and benefits of alternatives.  Questions were encouraged and answered and the patient and/or representative agrees to proceed.  Blood Products: Not Anticipated      Anesthesia ROS/Med Hx        Pulmonary Review:    Negative for pulmonary    Neuro/Psych Review:    Negative for all neuro/psych ROS    Cardiovascular Review:    Pt. negative for all cardio ROS    GI/HEPATIC/RENAL Review:    Pt. negative for GI/Hepatic/Renal ROS    End/Other Review:    Pt. negative for endo/other ROS    
No

## 2019-12-13 NOTE — DISCHARGE NOTE NURSING/CASE MANAGEMENT/SOCIAL WORK - PATIENT PORTAL LINK FT
You can access the FollowMyHealth Patient Portal offered by [facility] by registering at the following website: [email or web address]. By joining [employer]’s FollowMyHealth portal, you will also be able to view your health information using other applications (apps) compatible with our system.

## 2019-12-13 NOTE — DISCHARGE NOTE PROVIDER - CARE PROVIDER_API CALL
Hector Nelson)  Surgery  [address], [address]  [place], [place] [address]  Phone: [phone]  Fax: [phone]  Follow Up Time:

## 2019-12-13 NOTE — DISCHARGE NOTE PROVIDER - NSDCFUADDINST_GEN_ALL_CORE_FT
General Discharge Instructions:  Please resume all regular home medications unless specifically advised not to take a particular medication. Also, please take any new medications as prescribed.  Please get plenty of rest, continue to ambulate several times per day, and drink adequate amounts of fluids. Avoid lifting weights greater than 5-10 lbs until you follow-up with your surgeon, who will instruct you further regarding activity restrictions.  Avoid driving or operating heavy machinery while taking pain medications.  Please follow-up with your surgeon and Primary Care Provider (PCP) as advised.  Incision Care:  *Please call your doctor or nurse practitioner if you have increased pain, swelling, redness, or drainage from the incision site.  *Avoid swimming and baths until your follow-up appointment.  *You may shower, and wash surgical incisions with a mild soap and warm water. Gently pat the area dry.    Warning Signs:  Please call your doctor or nurse practitioner if you experience the following:  *You experience new chest pain, pressure, squeezing or tightness.  *New or worsening cough, shortness of breath, or wheeze.  *If you are vomiting and cannot keep down fluids or your medications.  *You are getting dehydrated due to continued vomiting, diarrhea, or other reasons. Signs of dehydration include dry mouth, rapid heartbeat, or feeling dizzy or faint when standing.  *You see blood or dark/black material when you vomit or have a bowel movement.  *You experience burning when you urinate, have blood in your urine, or experience a discharge.  *Your pain is not improving within 8-12 hours or is not gone within 24 hours. Call or return immediately if your pain is getting worse, changes location, or moves to your chest or back.  *You have shaking chills, or fever greater than 101.5 degrees Fahrenheit or 38 degrees Celsius.  *Any change in your symptoms, or any new symptoms that concern you.

## 2019-12-13 NOTE — PROGRESS NOTE ADULT - SUBJECTIVE AND OBJECTIVE BOX
SUBJECTIVE: Patient seen and examined bedside by chief resident. Hypertensive overnight given labetalol. No complaints. No nausea or vomiting. Tolerating diet. Full bowel function.       MEDICATIONS  (PRN):  HYDROmorphone  Injectable 0.5 milliGRAM(s) IV Push every 4 hours PRN Severe Pain (7 - 10)  HYDROmorphone  Injectable 0.25 milliGRAM(s) IV Push every 4 hours PRN Moderate Pain (4 - 6)  ondansetron Injectable 4 milliGRAM(s) IV Push every 6 hours PRN Nausea      I&O's Detail    12 Dec 2019 07:01  -  13 Dec 2019 07:00  --------------------------------------------------------  IN:    IV PiggyBack: 100 mL  Total IN: 100 mL    OUT:    Voided: 650 mL  Total OUT: 650 mL    Total NET: -550 mL          Vital Signs Last 24 Hrs  T(C): 36.8 (13 Dec 2019 08:30), Max: 36.8 (13 Dec 2019 08:30)  T(F): 98.2 (13 Dec 2019 08:30), Max: 98.2 (13 Dec 2019 08:30)  HR: 89 (13 Dec 2019 08:30) (67 - 97)  BP: 163/86 (13 Dec 2019 08:30) (142/71 - 187/89)  BP(mean): --  RR: 17 (13 Dec 2019 08:30) (17 - 20)  SpO2: 95% (13 Dec 2019 08:30) (94% - 98%)    General: NAD, resting comfortably in bed  C/V: NSR  Pulm: Nonlabored breathing, no respiratory distress  Abd: soft, ND, NT, incisions c/d/i  Extrem: WWP, no edema, SCDs in place    LABS:                        10.9   15.06 )-----------( 329      ( 13 Dec 2019 08:25 )             35.3     12-13    137  |  100  |  29<H>  ----------------------------<  135<H>  4.5   |  26  |  1.28    Ca    9.6      13 Dec 2019 08:25  Phos  3.6     12-13  Mg     2.2     12-13    TPro  8.1  /  Alb  4.0  /  TBili  0.5  /  DBili  x   /  AST  62<H>  /  ALT  79<H>  /  AlkPhos  86  12-13          RADIOLOGY & ADDITIONAL STUDIES:

## 2019-12-13 NOTE — PROGRESS NOTE ADULT - ASSESSMENT
Pt is a 68 y/o M with PMH of Gout, lung cancer s/p Right lobectomy and chemo in 2016, COPD, chronic pancreatitis, emphysema, CAD that presents today for laparoscopic liver resection for biopsy proven hepatocellular carcinoma. now s/p laparoscopic right hepatic wedge resection    - regional  - regular diet, IVF   - Pain/nausea control  - SCDs, holding SQH   - Home today

## 2019-12-13 NOTE — DISCHARGE NOTE PROVIDER - NSDCFUADDAPPT_GEN_ALL_CORE_FT
Please call Dr. Nelson's office this week to schedule a follow-up appointment.    Please also call your primary care provider to schedule a follow up visit with them as well.

## 2019-12-13 NOTE — DISCHARGE NOTE PROVIDER - HOSPITAL COURSE
Pt is a 68 y/o M with PMH of Gout, lung cancer s/p Right lobectomy and chemo in 2016, COPD, chronic pancreatitis, emphysema, CAD that presents today for laparoscopic liver resection for biopsy proven hepatocellular carcinoma. patient noticed significant weight loss as well as multiple episodes of vomiting which lead him to a workup that discovered HCC. patient without complaints today.     On 12/11 patient underwent laparoscopic wedge resection of R liver lesion. The procedure was uncomplicated.    Patient's post-operative course was uncomplicated. Diet was advanced as tolerated and pain was well controlled on medication. On day of discharge, pt deemed stable and ready to return home with plan to follow up as an outpatient.

## 2019-12-13 NOTE — DISCHARGE NOTE PROVIDER - NSDCCPCAREPLAN_GEN_ALL_CORE_FT
PRINCIPAL DISCHARGE DIAGNOSIS  Diagnosis: Hepatocellular carcinoma  Assessment and Plan of Treatment:

## 2019-12-16 LAB — SURGICAL PATHOLOGY STUDY: SIGNIFICANT CHANGE UP

## 2019-12-17 DIAGNOSIS — Z85.118 PERSONAL HISTORY OF OTHER MALIGNANT NEOPLASM OF BRONCHUS AND LUNG: ICD-10-CM

## 2019-12-17 DIAGNOSIS — N18.9 CHRONIC KIDNEY DISEASE, UNSPECIFIED: ICD-10-CM

## 2019-12-17 DIAGNOSIS — E43 UNSPECIFIED SEVERE PROTEIN-CALORIE MALNUTRITION: ICD-10-CM

## 2019-12-17 DIAGNOSIS — K86.1 OTHER CHRONIC PANCREATITIS: ICD-10-CM

## 2019-12-17 DIAGNOSIS — J43.9 EMPHYSEMA, UNSPECIFIED: ICD-10-CM

## 2019-12-17 DIAGNOSIS — I25.10 ATHEROSCLEROTIC HEART DISEASE OF NATIVE CORONARY ARTERY WITHOUT ANGINA PECTORIS: ICD-10-CM

## 2019-12-17 DIAGNOSIS — C22.0 LIVER CELL CARCINOMA: ICD-10-CM

## 2019-12-17 DIAGNOSIS — M10.9 GOUT, UNSPECIFIED: ICD-10-CM

## 2019-12-17 DIAGNOSIS — Z90.2 ACQUIRED ABSENCE OF LUNG [PART OF]: ICD-10-CM

## 2019-12-17 DIAGNOSIS — Z92.21 PERSONAL HISTORY OF ANTINEOPLASTIC CHEMOTHERAPY: ICD-10-CM

## 2019-12-17 DIAGNOSIS — D64.9 ANEMIA, UNSPECIFIED: ICD-10-CM

## 2019-12-20 PROCEDURE — 86901 BLOOD TYPING SEROLOGIC RH(D): CPT

## 2019-12-20 PROCEDURE — 85027 COMPLETE CBC AUTOMATED: CPT

## 2019-12-20 PROCEDURE — 84100 ASSAY OF PHOSPHORUS: CPT

## 2019-12-20 PROCEDURE — 36415 COLL VENOUS BLD VENIPUNCTURE: CPT

## 2019-12-20 PROCEDURE — 82803 BLOOD GASES ANY COMBINATION: CPT

## 2019-12-20 PROCEDURE — 88309 TISSUE EXAM BY PATHOLOGIST: CPT

## 2019-12-20 PROCEDURE — 86850 RBC ANTIBODY SCREEN: CPT

## 2019-12-20 PROCEDURE — 86803 HEPATITIS C AB TEST: CPT

## 2019-12-20 PROCEDURE — 85025 COMPLETE CBC W/AUTO DIFF WBC: CPT

## 2019-12-20 PROCEDURE — 82962 GLUCOSE BLOOD TEST: CPT

## 2019-12-20 PROCEDURE — 80053 COMPREHEN METABOLIC PANEL: CPT

## 2019-12-20 PROCEDURE — 86900 BLOOD TYPING SEROLOGIC ABO: CPT

## 2019-12-20 PROCEDURE — 83735 ASSAY OF MAGNESIUM: CPT

## 2019-12-20 PROCEDURE — C1889: CPT

## 2019-12-23 ENCOUNTER — APPOINTMENT (OUTPATIENT)
Dept: SURGICAL ONCOLOGY | Facility: CLINIC | Age: 69
End: 2019-12-23
Payer: MEDICARE

## 2019-12-23 VITALS
SYSTOLIC BLOOD PRESSURE: 152 MMHG | WEIGHT: 100 LBS | BODY MASS INDEX: 14.81 KG/M2 | DIASTOLIC BLOOD PRESSURE: 84 MMHG | TEMPERATURE: 97.8 F | HEIGHT: 69 IN | HEART RATE: 89 BPM

## 2019-12-23 PROCEDURE — 99024 POSTOP FOLLOW-UP VISIT: CPT

## 2019-12-23 NOTE — HISTORY OF PRESENT ILLNESS
[FreeTextEntry1] : Patient Name: DAGMAR CAIN   Jul  3 1950 \par [employer] MRN: [id number]  \par [facility] MRN: [id number]\par Referring Provider:none \par Oncologist: \par \par Date of Visit: 2019 \par \par Diagnosis:Hepatoma\par Operative date: 19\par Procedure: Lap wedge liver resection\par Pathology: T2N0\par \par 12 days postop. surgery uneventful. discharged after 2 days. Since being home doing well without any unusual postop complaints. Final pathology T2N0 poorly differentiated. Margins clear.\par \par

## 2019-12-23 NOTE — REASON FOR VISIT
[de-identified] : Laparoscopic wedge liver resection [de-identified] : 12/11/19 [de-identified] : 12 [Spouse] : spouse

## 2019-12-23 NOTE — PHYSICAL EXAM
[Normal] : not distended, non tender, no masses, no hepatosplenomegaly [de-identified] : wounds well healed

## 2019-12-23 NOTE — ASSESSMENT
[FreeTextEntry1] : I) s/p wedge liver resection normal post op visit\par \par P) RTC 4 months. Will review at tumor board.\par \par Hector Nelson MD\par \par Chief Surgical Oncology\par Multidisciplinary GI cancer program\par [facility] [place]\par [facility]\par \par Professor Surgery\par [facility] [employer]\par  cc Dr Nunez, Dr Liang

## 2020-05-19 ENCOUNTER — APPOINTMENT (OUTPATIENT)
Dept: SURGICAL ONCOLOGY | Facility: CLINIC | Age: 70
End: 2020-05-19
Payer: MEDICARE

## 2020-05-19 VITALS
DIASTOLIC BLOOD PRESSURE: 102 MMHG | HEART RATE: 99 BPM | HEIGHT: 69 IN | SYSTOLIC BLOOD PRESSURE: 173 MMHG | WEIGHT: 103 LBS | TEMPERATURE: 97.8 F | BODY MASS INDEX: 15.26 KG/M2

## 2020-05-19 DIAGNOSIS — C22.0 LIVER CELL CARCINOMA: ICD-10-CM

## 2020-05-19 PROCEDURE — 99213 OFFICE O/P EST LOW 20 MIN: CPT

## 2020-05-19 RX ORDER — ALLOPURINOL 100 MG/1
100 TABLET ORAL
Refills: 0 | Status: ACTIVE | COMMUNITY

## 2020-05-19 NOTE — ASSESSMENT
[FreeTextEntry1] : I)JERILYN\par \par P) will reimage and RTC 6 months\par \par Hector Nelson MD\par \par Chief Surgical Oncology\par Multidisciplinary GI cancer program\par [facility] [place]\par [facility]\par \par Professor Surgery\par [facility] [employer]\par

## 2020-05-19 NOTE — HISTORY OF PRESENT ILLNESS
[de-identified] : Patient Name: DAGMAR CAIN \par MRN: [id number] \par [facility] MRN: [id number] \par Referring Provider: none \par Oncologist: Dr. Tereso Liang.\par Date: 5/19/2020\par \par Diagnosis: HCC\par Op Date: 12/11/19\par Procedure: Lap wedge liver resection\par Pathology: T2N0\par \par Mr. CAIN is a 69 year M who presents for follow up of HCC. He 5 months post op. He is scheduled to see Dr. Liang this Thursday. He is being treated for gout on his right foot and is now on Allopurinol. He hasn't had any recent blood work done as he has had trouble getting an appointment with Dr. Liang due to COVID-19.\par \par Curently, Mr. CAIN denies abdominal pain and discomfort, he is tolerating a regular diet. He denies any changes to bowel habits.\par \par

## 2020-05-19 NOTE — PHYSICAL EXAM
[Normal] : oriented to person, place and time, with appropriate affect [de-identified] : Thin, no acute distress [de-identified] : S1,S2, regular rate and rhythm. No murmurs heard. [de-identified] : Anicteric [de-identified] : well healed scars [de-identified] : Warm and dry [de-identified] : Clear throughout. No wheezes heard.

## 2020-06-03 ENCOUNTER — APPOINTMENT (OUTPATIENT)
Dept: MRI IMAGING | Facility: CLINIC | Age: 70
End: 2020-06-03

## 2020-06-17 ENCOUNTER — OUTPATIENT (OUTPATIENT)
Dept: OUTPATIENT SERVICES | Facility: HOSPITAL | Age: 70
LOS: 1 days | End: 2020-06-17

## 2020-06-17 ENCOUNTER — APPOINTMENT (OUTPATIENT)
Dept: MRI IMAGING | Facility: CLINIC | Age: 70
End: 2020-06-17
Payer: MEDICARE

## 2020-06-17 DIAGNOSIS — S86.009A UNSPECIFIED INJURY OF UNSPECIFIED ACHILLES TENDON, INITIAL ENCOUNTER: Chronic | ICD-10-CM

## 2020-06-17 DIAGNOSIS — Z90.2 ACQUIRED ABSENCE OF LUNG [PART OF]: Chronic | ICD-10-CM

## 2020-06-17 PROCEDURE — 74183 MRI ABD W/O CNTR FLWD CNTR: CPT | Mod: 26

## 2020-06-22 ENCOUNTER — APPOINTMENT (OUTPATIENT)
Dept: CT IMAGING | Facility: CLINIC | Age: 70
End: 2020-06-22
Payer: MEDICARE

## 2020-06-22 ENCOUNTER — OUTPATIENT (OUTPATIENT)
Dept: OUTPATIENT SERVICES | Facility: HOSPITAL | Age: 70
LOS: 1 days | End: 2020-06-22

## 2020-06-22 ENCOUNTER — RESULT REVIEW (OUTPATIENT)
Age: 70
End: 2020-06-22

## 2020-06-22 DIAGNOSIS — Z90.2 ACQUIRED ABSENCE OF LUNG [PART OF]: Chronic | ICD-10-CM

## 2020-06-22 DIAGNOSIS — S86.009A UNSPECIFIED INJURY OF UNSPECIFIED ACHILLES TENDON, INITIAL ENCOUNTER: Chronic | ICD-10-CM

## 2020-06-22 PROCEDURE — 74160 CT ABDOMEN W/CONTRAST: CPT | Mod: 26

## 2020-07-01 ENCOUNTER — APPOINTMENT (OUTPATIENT)
Dept: INTERVENTIONAL RADIOLOGY/VASCULAR | Facility: HOSPITAL | Age: 70
End: 2020-07-01
Payer: MEDICARE

## 2020-07-01 PROCEDURE — XXXXX: CPT

## 2020-07-01 NOTE — HISTORY OF PRESENT ILLNESS
[Home] : at home, [unfilled] , at the time of the visit. [Medical Office: ([facility])___] : at the medical office located in  [Spouse] : spouse [Verbal consent obtained from patient] : the patient, [unfilled] [FreeTextEntry1] : The patients wife Keke García requested that a discussion of the planned yttrium 90 radioembolization procedure occur over the phone but was not able to participate in telehealth or face time.  All information was provided by Ms García\par \par Mr García is a 69 year old gentleman diagnosed with an exophytic right lobe segment 6 hepatocellular carcinoma (biopsy proven) which was resected by Dr Hector Nelson on 12/11/2019.  Patient had an uneventful recovery.  Recent CT and MRI showed multifocal HCC primarily in segments 6 and 7 and no left lobe disease.  Tumor board recommendation on 6/29/2020 was locoregional therapy.   [de-identified] : Multiple LIRADS 4 lesions in segment 6. 6/22/2020\par \par 7 mm LIRADS 3 focus in segment 7.  [de-identified] : Impression: Percutaneous ultrasound-guided core biopsy and aspirate of a \par right lobe liver mass as described above. 10/2/2019

## 2020-07-01 NOTE — REVIEW OF SYSTEMS
[Negative] : Heme/Lymph [FreeTextEntry9] : Recent development of localized wound infection at site of achilles tendon surgery

## 2020-07-01 NOTE — CONSULT LETTER
[Dear  ___] : Dear  [unfilled], [Please see my note below.] : Please see my note below. [Consult Closing:] : Thank you very much for allowing me to participate in the care of this patient.  If you have any questions, please do not hesitate to contact me. [Sincerely,] : Sincerely, [DrJeromy  ___] : Dr. ACKERMAN [FreeTextEntry3] : Srini Garcia MD, FSIR\par Chief Interventional Radiology\par [facility]\par [facility]\par  [DrJeromy ___] : Dr. ACKERMAN

## 2020-07-01 NOTE — ASSESSMENT
[FreeTextEntry1] : Mr García is a 69 year old gentleman with recurrent right lobe HCC by imaging criterial following surgical resection on 12/11/2019.  Y90 radioembolization of the tumor involved right lobe ( segments 6 and 7 ) discussed at length with the wife including possible risks and side effects.  She was given an opportunity to ask questions about the procedure.  The patient will obtain medical clearance from Dr Linda Brink

## 2020-07-15 ENCOUNTER — LABORATORY RESULT (OUTPATIENT)
Age: 70
End: 2020-07-15

## 2020-07-16 ENCOUNTER — RESULT REVIEW (OUTPATIENT)
Age: 70
End: 2020-07-16

## 2020-07-16 ENCOUNTER — OUTPATIENT (OUTPATIENT)
Dept: OUTPATIENT SERVICES | Facility: HOSPITAL | Age: 70
LOS: 1 days | End: 2020-07-16
Payer: MEDICARE

## 2020-07-16 ENCOUNTER — APPOINTMENT (OUTPATIENT)
Dept: INTERVENTIONAL RADIOLOGY/VASCULAR | Facility: HOSPITAL | Age: 70
End: 2020-07-16
Payer: MEDICARE

## 2020-07-16 ENCOUNTER — APPOINTMENT (OUTPATIENT)
Dept: INTERVENTIONAL RADIOLOGY/VASCULAR | Facility: HOSPITAL | Age: 70
End: 2020-07-16

## 2020-07-16 DIAGNOSIS — Z90.2 ACQUIRED ABSENCE OF LUNG [PART OF]: Chronic | ICD-10-CM

## 2020-07-16 DIAGNOSIS — S86.009A UNSPECIFIED INJURY OF UNSPECIFIED ACHILLES TENDON, INITIAL ENCOUNTER: Chronic | ICD-10-CM

## 2020-07-16 LAB
ALBUMIN SERPL ELPH-MCNC: 4.4 G/DL — SIGNIFICANT CHANGE UP (ref 3.3–5)
ALP SERPL-CCNC: 105 U/L — SIGNIFICANT CHANGE UP (ref 40–120)
ALT FLD-CCNC: 11 U/L — SIGNIFICANT CHANGE UP (ref 10–45)
ANION GAP SERPL CALC-SCNC: 17 MMOL/L — SIGNIFICANT CHANGE UP (ref 5–17)
AST SERPL-CCNC: 31 U/L — SIGNIFICANT CHANGE UP (ref 10–40)
BILIRUB SERPL-MCNC: 0.3 MG/DL — SIGNIFICANT CHANGE UP (ref 0.2–1.2)
BUN SERPL-MCNC: 23 MG/DL — SIGNIFICANT CHANGE UP (ref 7–23)
CALCIUM SERPL-MCNC: 10.2 MG/DL — SIGNIFICANT CHANGE UP (ref 8.4–10.5)
CHLORIDE SERPL-SCNC: 99 MMOL/L — SIGNIFICANT CHANGE UP (ref 96–108)
CO2 SERPL-SCNC: 25 MMOL/L — SIGNIFICANT CHANGE UP (ref 22–31)
CREAT SERPL-MCNC: 1.49 MG/DL — HIGH (ref 0.5–1.3)
GLUCOSE SERPL-MCNC: 136 MG/DL — HIGH (ref 70–99)
POTASSIUM SERPL-MCNC: 4.4 MMOL/L — SIGNIFICANT CHANGE UP (ref 3.5–5.3)
POTASSIUM SERPL-SCNC: 4.4 MMOL/L — SIGNIFICANT CHANGE UP (ref 3.5–5.3)
PROT SERPL-MCNC: 8.6 G/DL — HIGH (ref 6–8.3)
SODIUM SERPL-SCNC: 141 MMOL/L — SIGNIFICANT CHANGE UP (ref 135–145)

## 2020-07-16 PROCEDURE — 78830 RP LOCLZJ TUM SPECT W/CT 1: CPT

## 2020-07-16 PROCEDURE — 36415 COLL VENOUS BLD VENIPUNCTURE: CPT

## 2020-07-16 PROCEDURE — 75774 ARTERY X-RAY EACH VESSEL: CPT

## 2020-07-16 PROCEDURE — 36248 INS CATH ABD/L-EXT ART ADDL: CPT

## 2020-07-16 PROCEDURE — C1894: CPT

## 2020-07-16 PROCEDURE — C1887: CPT

## 2020-07-16 PROCEDURE — 77263 THER RADIOLOGY TX PLNG CPLX: CPT

## 2020-07-16 PROCEDURE — 78830 RP LOCLZJ TUM SPECT W/CT 1: CPT | Mod: 26

## 2020-07-16 PROCEDURE — 80053 COMPREHEN METABOLIC PANEL: CPT

## 2020-07-16 PROCEDURE — 36247 INS CATH ABD/L-EXT ART 3RD: CPT | Mod: 59

## 2020-07-16 PROCEDURE — A9540: CPT

## 2020-07-16 PROCEDURE — 77300 RADIATION THERAPY DOSE PLAN: CPT | Mod: 26

## 2020-07-16 PROCEDURE — 75774 ARTERY X-RAY EACH VESSEL: CPT | Mod: 26,59,76

## 2020-07-16 PROCEDURE — C1769: CPT

## 2020-07-16 PROCEDURE — 36245 INS CATH ABD/L-EXT ART 1ST: CPT | Mod: XS

## 2020-07-16 PROCEDURE — 75726 ARTERY X-RAYS ABDOMEN: CPT | Mod: 59

## 2020-07-16 PROCEDURE — 75726 ARTERY X-RAYS ABDOMEN: CPT | Mod: 26

## 2020-07-16 PROCEDURE — 77300 RADIATION THERAPY DOSE PLAN: CPT

## 2020-07-16 PROCEDURE — 36247 INS CATH ABD/L-EXT ART 3RD: CPT

## 2020-07-27 ENCOUNTER — LABORATORY RESULT (OUTPATIENT)
Age: 70
End: 2020-07-27

## 2020-07-28 ENCOUNTER — RESULT REVIEW (OUTPATIENT)
Age: 70
End: 2020-07-28

## 2020-07-28 ENCOUNTER — APPOINTMENT (OUTPATIENT)
Dept: INTERVENTIONAL RADIOLOGY/VASCULAR | Facility: HOSPITAL | Age: 70
End: 2020-07-28

## 2020-07-28 ENCOUNTER — OUTPATIENT (OUTPATIENT)
Dept: OUTPATIENT SERVICES | Facility: HOSPITAL | Age: 70
LOS: 1 days | End: 2020-07-28
Payer: MEDICARE

## 2020-07-28 ENCOUNTER — APPOINTMENT (OUTPATIENT)
Dept: INTERVENTIONAL RADIOLOGY/VASCULAR | Facility: HOSPITAL | Age: 70
End: 2020-07-28
Payer: MEDICARE

## 2020-07-28 DIAGNOSIS — Z90.2 ACQUIRED ABSENCE OF LUNG [PART OF]: Chronic | ICD-10-CM

## 2020-07-28 DIAGNOSIS — S86.009A UNSPECIFIED INJURY OF UNSPECIFIED ACHILLES TENDON, INITIAL ENCOUNTER: Chronic | ICD-10-CM

## 2020-07-28 PROCEDURE — 36247 INS CATH ABD/L-EXT ART 3RD: CPT

## 2020-07-28 PROCEDURE — 79445 NUCLEAR RX INTRA-ARTERIAL: CPT | Mod: 26

## 2020-07-28 PROCEDURE — C2616: CPT

## 2020-07-28 PROCEDURE — 79445 NUCLEAR RX INTRA-ARTERIAL: CPT

## 2020-07-28 PROCEDURE — 37243 VASC EMBOLIZE/OCCLUDE ORGAN: CPT

## 2020-07-28 PROCEDURE — 78830 RP LOCLZJ TUM SPECT W/CT 1: CPT | Mod: 26

## 2020-07-28 PROCEDURE — C1887: CPT

## 2020-07-28 PROCEDURE — 78803 RP LOCLZJ TUM SPECT 1 AREA: CPT

## 2020-07-28 PROCEDURE — C1894: CPT

## 2020-07-28 PROCEDURE — 78830 RP LOCLZJ TUM SPECT W/CT 1: CPT

## 2020-07-28 PROCEDURE — C1769: CPT

## 2020-07-29 ENCOUNTER — APPOINTMENT (OUTPATIENT)
Dept: INTERVENTIONAL RADIOLOGY/VASCULAR | Facility: HOSPITAL | Age: 70
End: 2020-07-29

## 2020-08-07 ENCOUNTER — LABORATORY RESULT (OUTPATIENT)
Age: 70
End: 2020-08-07

## 2020-08-11 ENCOUNTER — APPOINTMENT (OUTPATIENT)
Dept: INTERVENTIONAL RADIOLOGY/VASCULAR | Facility: HOSPITAL | Age: 70
End: 2020-08-11

## 2020-09-29 ENCOUNTER — APPOINTMENT (OUTPATIENT)
Dept: MRI IMAGING | Facility: HOSPITAL | Age: 70
End: 2020-09-29
Payer: MEDICARE

## 2020-10-02 ENCOUNTER — RESULT REVIEW (OUTPATIENT)
Age: 70
End: 2020-10-02

## 2020-10-02 ENCOUNTER — OUTPATIENT (OUTPATIENT)
Dept: OUTPATIENT SERVICES | Facility: HOSPITAL | Age: 70
LOS: 1 days | End: 2020-10-02
Payer: MEDICARE

## 2020-10-02 ENCOUNTER — APPOINTMENT (OUTPATIENT)
Dept: MRI IMAGING | Facility: HOSPITAL | Age: 70
End: 2020-10-02

## 2020-10-02 DIAGNOSIS — S86.009A UNSPECIFIED INJURY OF UNSPECIFIED ACHILLES TENDON, INITIAL ENCOUNTER: Chronic | ICD-10-CM

## 2020-10-02 DIAGNOSIS — Z90.2 ACQUIRED ABSENCE OF LUNG [PART OF]: Chronic | ICD-10-CM

## 2020-10-02 PROCEDURE — 74183 MRI ABD W/O CNTR FLWD CNTR: CPT

## 2020-10-02 PROCEDURE — A9585: CPT

## 2020-10-02 PROCEDURE — 74183 MRI ABD W/O CNTR FLWD CNTR: CPT | Mod: 26

## 2020-10-20 ENCOUNTER — LABORATORY RESULT (OUTPATIENT)
Age: 70
End: 2020-10-20

## 2020-10-23 ENCOUNTER — APPOINTMENT (OUTPATIENT)
Dept: INTERVENTIONAL RADIOLOGY/VASCULAR | Facility: HOSPITAL | Age: 70
End: 2020-10-23
Payer: MEDICARE

## 2020-10-23 ENCOUNTER — OUTPATIENT (OUTPATIENT)
Dept: OUTPATIENT SERVICES | Facility: HOSPITAL | Age: 70
LOS: 1 days | End: 2020-10-23
Payer: MEDICARE

## 2020-10-23 ENCOUNTER — RESULT REVIEW (OUTPATIENT)
Age: 70
End: 2020-10-23

## 2020-10-23 DIAGNOSIS — S86.009A UNSPECIFIED INJURY OF UNSPECIFIED ACHILLES TENDON, INITIAL ENCOUNTER: Chronic | ICD-10-CM

## 2020-10-23 DIAGNOSIS — Z90.2 ACQUIRED ABSENCE OF LUNG [PART OF]: Chronic | ICD-10-CM

## 2020-10-23 PROCEDURE — C1887: CPT

## 2020-10-23 PROCEDURE — 75726 ARTERY X-RAYS ABDOMEN: CPT

## 2020-10-23 PROCEDURE — 36248 INS CATH ABD/L-EXT ART ADDL: CPT

## 2020-10-23 PROCEDURE — 75774 ARTERY X-RAY EACH VESSEL: CPT | Mod: 26

## 2020-10-23 PROCEDURE — 75774 ARTERY X-RAY EACH VESSEL: CPT | Mod: 59

## 2020-10-23 PROCEDURE — 75726 ARTERY X-RAYS ABDOMEN: CPT | Mod: 26

## 2020-10-23 PROCEDURE — 36415 COLL VENOUS BLD VENIPUNCTURE: CPT

## 2020-10-23 PROCEDURE — 77300 RADIATION THERAPY DOSE PLAN: CPT

## 2020-10-23 PROCEDURE — C1894: CPT

## 2020-10-23 PROCEDURE — 77300 RADIATION THERAPY DOSE PLAN: CPT | Mod: 26

## 2020-10-23 PROCEDURE — 78830 RP LOCLZJ TUM SPECT W/CT 1: CPT | Mod: 26

## 2020-10-23 PROCEDURE — 75774 ARTERY X-RAY EACH VESSEL: CPT | Mod: 26,59,76

## 2020-10-23 PROCEDURE — 36247 INS CATH ABD/L-EXT ART 3RD: CPT

## 2020-10-23 PROCEDURE — 77263 THER RADIOLOGY TX PLNG CPLX: CPT

## 2020-10-23 PROCEDURE — 80053 COMPREHEN METABOLIC PANEL: CPT

## 2020-10-23 PROCEDURE — C1769: CPT

## 2020-10-23 PROCEDURE — 78830 RP LOCLZJ TUM SPECT W/CT 1: CPT

## 2020-10-23 PROCEDURE — 82962 GLUCOSE BLOOD TEST: CPT

## 2020-10-26 ENCOUNTER — LABORATORY RESULT (OUTPATIENT)
Age: 70
End: 2020-10-26

## 2020-10-28 ENCOUNTER — RESULT REVIEW (OUTPATIENT)
Age: 70
End: 2020-10-28

## 2020-10-28 ENCOUNTER — APPOINTMENT (OUTPATIENT)
Dept: INTERVENTIONAL RADIOLOGY/VASCULAR | Facility: HOSPITAL | Age: 70
End: 2020-10-28
Payer: MEDICARE

## 2020-10-28 ENCOUNTER — OUTPATIENT (OUTPATIENT)
Dept: OUTPATIENT SERVICES | Facility: HOSPITAL | Age: 70
LOS: 1 days | End: 2020-10-28
Payer: MEDICARE

## 2020-10-28 DIAGNOSIS — S86.009A UNSPECIFIED INJURY OF UNSPECIFIED ACHILLES TENDON, INITIAL ENCOUNTER: Chronic | ICD-10-CM

## 2020-10-28 DIAGNOSIS — Z90.2 ACQUIRED ABSENCE OF LUNG [PART OF]: Chronic | ICD-10-CM

## 2020-10-28 PROCEDURE — 78830 RP LOCLZJ TUM SPECT W/CT 1: CPT | Mod: 26

## 2020-10-28 PROCEDURE — C1887: CPT

## 2020-10-28 PROCEDURE — 36247 INS CATH ABD/L-EXT ART 3RD: CPT

## 2020-10-28 PROCEDURE — 78830 RP LOCLZJ TUM SPECT W/CT 1: CPT | Mod: 26,59

## 2020-10-28 PROCEDURE — 37243 VASC EMBOLIZE/OCCLUDE ORGAN: CPT

## 2020-10-28 PROCEDURE — C2616: CPT

## 2020-10-28 PROCEDURE — 79445 NUCLEAR RX INTRA-ARTERIAL: CPT | Mod: 26

## 2020-10-28 PROCEDURE — 78830 RP LOCLZJ TUM SPECT W/CT 1: CPT

## 2020-10-28 PROCEDURE — C1894: CPT

## 2020-10-28 PROCEDURE — 79445 NUCLEAR RX INTRA-ARTERIAL: CPT

## 2020-10-28 PROCEDURE — C1769: CPT

## 2020-11-11 ENCOUNTER — APPOINTMENT (OUTPATIENT)
Dept: INTERVENTIONAL RADIOLOGY/VASCULAR | Facility: HOSPITAL | Age: 70
End: 2020-11-11

## 2020-11-12 ENCOUNTER — LABORATORY RESULT (OUTPATIENT)
Age: 70
End: 2020-11-12

## 2021-01-12 ENCOUNTER — APPOINTMENT (OUTPATIENT)
Dept: MRI IMAGING | Facility: HOSPITAL | Age: 71
End: 2021-01-12

## 2021-04-13 ENCOUNTER — OUTPATIENT (OUTPATIENT)
Dept: OUTPATIENT SERVICES | Facility: HOSPITAL | Age: 71
LOS: 1 days | End: 2021-04-13

## 2021-04-13 ENCOUNTER — APPOINTMENT (OUTPATIENT)
Dept: MRI IMAGING | Facility: CLINIC | Age: 71
End: 2021-04-13
Payer: MEDICARE

## 2021-04-13 ENCOUNTER — RESULT REVIEW (OUTPATIENT)
Age: 71
End: 2021-04-13

## 2021-04-13 DIAGNOSIS — S86.009A UNSPECIFIED INJURY OF UNSPECIFIED ACHILLES TENDON, INITIAL ENCOUNTER: Chronic | ICD-10-CM

## 2021-04-13 DIAGNOSIS — Z90.2 ACQUIRED ABSENCE OF LUNG [PART OF]: Chronic | ICD-10-CM

## 2021-04-13 PROCEDURE — 74183 MRI ABD W/O CNTR FLWD CNTR: CPT | Mod: 26

## 2021-04-21 ENCOUNTER — OUTPATIENT (OUTPATIENT)
Dept: OUTPATIENT SERVICES | Facility: HOSPITAL | Age: 71
LOS: 1 days | End: 2021-04-21
Payer: MEDICARE

## 2021-04-21 ENCOUNTER — RESULT REVIEW (OUTPATIENT)
Age: 71
End: 2021-04-21

## 2021-04-21 ENCOUNTER — APPOINTMENT (OUTPATIENT)
Dept: ULTRASOUND IMAGING | Facility: HOSPITAL | Age: 71
End: 2021-04-21
Payer: MEDICARE

## 2021-04-21 DIAGNOSIS — S86.009A UNSPECIFIED INJURY OF UNSPECIFIED ACHILLES TENDON, INITIAL ENCOUNTER: Chronic | ICD-10-CM

## 2021-04-21 DIAGNOSIS — Z90.2 ACQUIRED ABSENCE OF LUNG [PART OF]: Chronic | ICD-10-CM

## 2021-04-21 PROCEDURE — 76705 ECHO EXAM OF ABDOMEN: CPT

## 2021-04-21 PROCEDURE — 76705 ECHO EXAM OF ABDOMEN: CPT | Mod: 26

## 2021-05-06 ENCOUNTER — APPOINTMENT (OUTPATIENT)
Dept: INTERVENTIONAL RADIOLOGY/VASCULAR | Facility: HOSPITAL | Age: 71
End: 2021-05-06

## 2021-05-06 ENCOUNTER — APPOINTMENT (OUTPATIENT)
Dept: CT IMAGING | Facility: HOSPITAL | Age: 71
End: 2021-05-06

## 2021-07-13 ENCOUNTER — APPOINTMENT (OUTPATIENT)
Dept: MRI IMAGING | Facility: CLINIC | Age: 71
End: 2021-07-13

## 2022-06-29 NOTE — PATIENT PROFILE ADULT - NSPRESCRALCFREQ_GEN_A_NUR
Never Drysol Pregnancy And Lactation Text: This medication is considered safe during pregnancy and breast feeding.

## 2024-04-01 NOTE — PATIENT PROFILE ADULT - FUNCTIONAL SCREEN CURRENT LEVEL: EATING, MLM
Pharmacist Admission Medication History    Admission medication history is complete. The information provided in this note is only as accurate as the sources available at the time of the update.    Information Source(s): Reviewed medication dispense history ([employer]); Pre-op nursing last times of administration     Changes made to PTA medication list:  Added: None  Deleted: None  Changed: None    Pertinent Information: Reviewed recent fill history. Besides medications prescribed as prep prior to surgery, only other med fill in 2024 is for gabapentin on 2/14 for 34 day supply.     Prior to Admission medications    Medication Sig Last Dose Taking? Auth Provider Long Term End Date   magnesium citrate 1.745 GM/30ML solution Drink 1 bottle at 8pm the night before surgery 3/31/2024 at 2000 Yes Kendrick Hernandez MD     metroNIDAZOLE (FLAGYL) 500 MG tablet Take 1 tablet (500 mg) by mouth every 6 hours At 8:00 am, 2:00 pm, 8:00 pm the day prior to your surgery with neomycin and zofran. 3/31/2024 at 2000 Yes Kendrick Hernandez MD     neomycin (MYCIFRADIN) 500 MG tablet Take 2 tablets (1,000 mg) by mouth every 6 hours At 8:00 am, 2:00 pm, 8:00 pm the day prior to your surgery with flagyl and zofran. 3/31/2024 at 2000 Yes Kendrick Hernandez MD     ondansetron (ZOFRAN) 4 MG tablet Take 1 tablet (4 mg) by mouth every 6 hours At 8:00 am, 2:00 pm, 8:00 pm the day prior to your surgery with neomycin and flagyl. 3/31/2024 at 2000 Yes Kendrick Hernandez MD     polyethylene glycol (MIRALAX) 17 GM/Dose powder Please take 238 grams mixed with 64 oz of Gatorade at 4pm the night before surgery 3/31/2024 at 1745 Yes Kendrick Hernandez MD       Medication History Completed By: Willie Gonzalez [facility] 4/1/2024 6:41 PM   0 = independent